# Patient Record
Sex: MALE | Race: OTHER | Employment: FULL TIME | URBAN - METROPOLITAN AREA
[De-identification: names, ages, dates, MRNs, and addresses within clinical notes are randomized per-mention and may not be internally consistent; named-entity substitution may affect disease eponyms.]

---

## 2017-03-11 ENCOUNTER — OFFICE VISIT (OUTPATIENT)
Dept: INTERNAL MEDICINE CLINIC | Facility: CLINIC | Age: 22
End: 2017-03-11

## 2017-03-11 VITALS
HEART RATE: 78 BPM | BODY MASS INDEX: 29.03 KG/M2 | WEIGHT: 196 LBS | SYSTOLIC BLOOD PRESSURE: 122 MMHG | DIASTOLIC BLOOD PRESSURE: 77 MMHG | HEIGHT: 69 IN | RESPIRATION RATE: 18 BRPM

## 2017-03-11 DIAGNOSIS — M54.6 ACUTE MIDLINE THORACIC BACK PAIN: Primary | ICD-10-CM

## 2017-03-11 DIAGNOSIS — K08.89 PAIN IN A TOOTH OR TEETH: ICD-10-CM

## 2017-03-11 PROCEDURE — 99212 OFFICE O/P EST SF 10 MIN: CPT | Performed by: INTERNAL MEDICINE

## 2017-03-11 PROCEDURE — 99214 OFFICE O/P EST MOD 30 MIN: CPT | Performed by: INTERNAL MEDICINE

## 2017-03-11 RX ORDER — NAPROXEN 375 MG/1
1 TABLET, DELAYED RELEASE ORAL EVERY 12 HOURS
Qty: 60 TABLET | Refills: 1 | Status: SHIPPED | OUTPATIENT
Start: 2017-03-11 | End: 2017-04-10

## 2017-03-11 RX ORDER — RANITIDINE 150 MG/1
150 CAPSULE ORAL 2 TIMES DAILY
Qty: 60 CAPSULE | Refills: 1 | Status: SHIPPED | OUTPATIENT
Start: 2017-03-11 | End: 2017-06-13

## 2017-03-11 NOTE — PATIENT INSTRUCTIONS
Exercises at Your Workstation: Shoulders  Tight shoulders? Aching back? A few easy moves can help your shoulders and back feel better. Take a few minutes during your day to do these exercises, right at your desk.  They'll loosen up your muscles, keep you

## 2017-03-11 NOTE — PROGRESS NOTES
HPI:    Patient ID: Maico Murdock is a 22year old male. HPI Comments: He was treated at school for sinusitis. He was given antibiotics twice. Now he has a minor headache when he wakes up. It comes in waves when he wakes up.   He also has pr Lymphadenopathy:     He has no cervical adenopathy. Neurological: He displays abnormal reflex. ASSESSMENT/PLAN:   1. Acute midline thoracic back pain  Muscle tension pain. Stretching exercises given. - Naproxen 375 MG Oral Tab EC;  Take 3

## 2017-06-13 ENCOUNTER — HOSPITAL ENCOUNTER (OUTPATIENT)
Dept: GENERAL RADIOLOGY | Facility: HOSPITAL | Age: 22
Discharge: HOME | End: 2017-06-13
Attending: INTERNAL MEDICINE
Payer: COMMERCIAL

## 2017-06-13 ENCOUNTER — OFFICE VISIT (OUTPATIENT)
Dept: INTERNAL MEDICINE CLINIC | Facility: CLINIC | Age: 22
End: 2017-06-13

## 2017-06-13 VITALS
SYSTOLIC BLOOD PRESSURE: 138 MMHG | HEIGHT: 69 IN | HEART RATE: 57 BPM | DIASTOLIC BLOOD PRESSURE: 79 MMHG | BODY MASS INDEX: 28.29 KG/M2 | WEIGHT: 191 LBS

## 2017-06-13 DIAGNOSIS — M25.521 RIGHT ELBOW PAIN: ICD-10-CM

## 2017-06-13 DIAGNOSIS — M25.521 RIGHT ELBOW PAIN: Primary | ICD-10-CM

## 2017-06-13 PROCEDURE — 73080 X-RAY EXAM OF ELBOW: CPT | Performed by: INTERNAL MEDICINE

## 2017-06-13 PROCEDURE — 99213 OFFICE O/P EST LOW 20 MIN: CPT | Performed by: INTERNAL MEDICINE

## 2017-06-13 PROCEDURE — 99212 OFFICE O/P EST SF 10 MIN: CPT | Performed by: INTERNAL MEDICINE

## 2017-06-13 NOTE — PATIENT INSTRUCTIONS
Await results of right elbow x-ray. Rest and elevate your right elbow and apply ice 2-3 times daily if helpful. Take Tylenol or ibuprofen as needed. Call if no better.

## 2017-06-13 NOTE — PROGRESS NOTES
Arik Hammond is a 22year old male. Patient presents with:  Elbow Pain: Pt c/o shooting pain in the right elbow     HPI:   He injured his right elbow at a water park on Thursday, 5 days ago.   He was coming down a water slide at a fast rate of sp extension pronation and supination without limitation. No right forearm right wrist or right hand tenderness. ASSESSMENT AND PLAN:   1. Right elbow pain  Likely contusion. Rule out fracture. X-ray right elbow today. Order sent.   Recommend rest, el

## 2018-05-23 ENCOUNTER — TELEPHONE (OUTPATIENT)
Dept: OPHTHALMOLOGY | Facility: CLINIC | Age: 23
End: 2018-05-23

## 2018-05-23 ENCOUNTER — OFFICE VISIT (OUTPATIENT)
Dept: INTERNAL MEDICINE CLINIC | Facility: CLINIC | Age: 23
End: 2018-05-23

## 2018-05-23 VITALS
WEIGHT: 198 LBS | BODY MASS INDEX: 29.33 KG/M2 | HEIGHT: 69 IN | SYSTOLIC BLOOD PRESSURE: 118 MMHG | HEART RATE: 72 BPM | DIASTOLIC BLOOD PRESSURE: 74 MMHG

## 2018-05-23 DIAGNOSIS — H57.11 PAIN OF RIGHT EYE: Primary | ICD-10-CM

## 2018-05-23 DIAGNOSIS — F31.89 SEVERE BIPOLAR AFFECTIVE DISORDER WITH PSYCHOSIS (HCC): ICD-10-CM

## 2018-05-23 PROCEDURE — 99213 OFFICE O/P EST LOW 20 MIN: CPT | Performed by: INTERNAL MEDICINE

## 2018-05-23 PROCEDURE — 99212 OFFICE O/P EST SF 10 MIN: CPT | Performed by: INTERNAL MEDICINE

## 2018-05-23 RX ORDER — ZIPRASIDONE HYDROCHLORIDE 20 MG/1
20 CAPSULE ORAL AS NEEDED
COMMUNITY

## 2018-05-23 RX ORDER — LITHIUM CARBONATE 450 MG
450 TABLET, EXTENDED RELEASE ORAL 2 TIMES DAILY
COMMUNITY
Start: 2018-04-03 | End: 2019-01-11 | Stop reason: ALTCHOICE

## 2018-05-23 RX ORDER — LAMOTRIGINE 25 MG/1
TABLET ORAL
COMMUNITY
Start: 2018-03-21 | End: 2018-05-23

## 2018-05-23 NOTE — PROGRESS NOTES
HPI:    Patient ID: Chad Hernández is a 25year old male. Pt c/o pain in his right eye for 2 days. It went away then came back. It doesn't feel like it is on the surface. It feels the worst in the morning. It has been itchy.       Eye Pain Position: Sitting, Cuff Size: large)   Pulse 72   Ht 5' 9\" (1.753 m)   Wt 198 lb (89.8 kg)   BMI 29.24 kg/m²   PHYSICAL EXAM:   Physical Exam    Constitutional: He is oriented to person, place, and time. He appears well-developed and well-nourished.    HEN

## 2018-05-24 NOTE — TELEPHONE ENCOUNTER
Pt complains of eye pain 6/10 only with eye movement side to side since Monday May 21. Pt does not wear contacts, no FB sensation. I offered appointment today with Dr. Caroline Partida or be seen with [facility] but pt declined due to woke.  Apt booked lisa

## 2018-05-24 NOTE — TELEPHONE ENCOUNTER
Pt called back but when the phone room tried to transfer the call; pt hung up. I tried calling back but no answer.

## 2018-05-25 ENCOUNTER — OFFICE VISIT (OUTPATIENT)
Dept: OPHTHALMOLOGY | Facility: CLINIC | Age: 23
End: 2018-05-25

## 2018-05-25 DIAGNOSIS — H02.003: ICD-10-CM

## 2018-05-25 DIAGNOSIS — H40.003 GLAUCOMA SUSPECT OF BOTH EYES: ICD-10-CM

## 2018-05-25 DIAGNOSIS — S05.01XA CONJUNCTIVAL ABRASION, RIGHT, INITIAL ENCOUNTER: Primary | ICD-10-CM

## 2018-05-25 PROCEDURE — 99243 OFF/OP CNSLTJ NEW/EST LOW 30: CPT | Performed by: OPHTHALMOLOGY

## 2018-05-25 PROCEDURE — 99212 OFFICE O/P EST SF 10 MIN: CPT | Performed by: OPHTHALMOLOGY

## 2018-05-25 RX ORDER — OFLOXACIN 3 MG/ML
1 SOLUTION/ DROPS OPHTHALMIC 3 TIMES DAILY
Qty: 1 BOTTLE | Refills: 0 | Status: SHIPPED | OUTPATIENT
Start: 2018-05-25 | End: 2018-05-30

## 2018-05-25 NOTE — PATIENT INSTRUCTIONS
Conjunctival abrasion, right, initial encounter  Abberant lash removed. Ofloxacin 3 times a day to Right eye for 5 days    Eyelashes turned in, right    Abberant lash in Meibomian gland opening turned inward. Removed.     Glaucoma suspect of both eyes  Incr

## 2018-05-25 NOTE — PROGRESS NOTES
Jaspreet Kaplan is a 25year old male. HPI:     HPI     Consult    Additional comments: Per Dr. Dinora Chandra   NP/ 25 yr old here for an evaluation of eye pain OD 6/10 since Monday May 21.  Pt does not wear any contacts and no FB se Gastrointestinal, Neurological, Skin, Genitourinary, Musculoskeletal, HENT, Endocrine, Cardiovascular, Respiratory, Psychiatric, Allergic/Imm, Heme/Lymph    Last edited by Binh Allen O.T. on 5/25/2018 12:22 PM. (History)          PHYSICAL EXAM: instructions:  Return in about 2 months (around 7/25/2018) for Complete eye exam and OCT due to status of glaucoma suspect . 5/25/2018  Scribed by: Nara Mejias MD

## 2019-01-08 ENCOUNTER — NURSE TRIAGE (OUTPATIENT)
Dept: INTERNAL MEDICINE CLINIC | Facility: CLINIC | Age: 24
End: 2019-01-08

## 2019-01-08 NOTE — TELEPHONE ENCOUNTER
Action Requested: Summary for Provider     []  Critical Lab, Recommendations Needed  [] Need Additional Advice  []   FYI    []   Need Orders  [] Need Medications Sent to Pharmacy  []  Other     SUMMARY: Appt scheduled for Fri 1/11/19 with Jos Arreola 150, for s

## 2019-01-11 ENCOUNTER — OFFICE VISIT (OUTPATIENT)
Dept: INTERNAL MEDICINE CLINIC | Facility: CLINIC | Age: 24
End: 2019-01-11
Payer: COMMERCIAL

## 2019-01-11 VITALS
BODY MASS INDEX: 31.7 KG/M2 | WEIGHT: 214 LBS | SYSTOLIC BLOOD PRESSURE: 118 MMHG | HEIGHT: 69 IN | HEART RATE: 64 BPM | DIASTOLIC BLOOD PRESSURE: 75 MMHG

## 2019-01-11 DIAGNOSIS — R21 RASH: Primary | ICD-10-CM

## 2019-01-11 PROCEDURE — 99213 OFFICE O/P EST LOW 20 MIN: CPT | Performed by: INTERNAL MEDICINE

## 2019-01-11 PROCEDURE — 99212 OFFICE O/P EST SF 10 MIN: CPT | Performed by: INTERNAL MEDICINE

## 2019-01-11 NOTE — PROGRESS NOTES
Carmelita Farr is a 26year old male. Patient presents with:  Rash    HPI:   Last October he developed a rash on his torso, affecting his chest and back. More recently, rash has spread to his arms. He has no associated pain or itchiness.   He andrés to the plan. The patient is asked to return as needed.     Michael Coronado MD  1/11/2019  1:23 PM

## 2022-05-14 ENCOUNTER — HOSPITAL ENCOUNTER (OUTPATIENT)
Age: 27
Discharge: HOME | End: 2022-05-14
Payer: COMMERCIAL

## 2022-05-14 VITALS
BODY MASS INDEX: 37.39 KG/M2 | SYSTOLIC BLOOD PRESSURE: 128 MMHG | DIASTOLIC BLOOD PRESSURE: 77 MMHG | OXYGEN SATURATION: 100 % | HEART RATE: 93 BPM | HEIGHT: 70 IN | TEMPERATURE: 97 F | RESPIRATION RATE: 21 BRPM | WEIGHT: 261.19 LBS

## 2022-05-14 DIAGNOSIS — U07.1 COVID-19: Primary | ICD-10-CM

## 2022-05-14 LAB
POCT INFLUENZA A: NEGATIVE
POCT INFLUENZA B: NEGATIVE
S PYO AG THROAT QL: NEGATIVE
SARS-COV-2 RNA RESP QL NAA+PROBE: DETECTED

## 2022-05-14 PROCEDURE — 99203 OFFICE O/P NEW LOW 30 MIN: CPT | Performed by: NURSE PRACTITIONER

## 2022-05-14 PROCEDURE — 87880 STREP A ASSAY W/OPTIC: CPT | Performed by: NURSE PRACTITIONER

## 2022-05-14 PROCEDURE — U0002 COVID-19 LAB TEST NON-CDC: HCPCS | Performed by: NURSE PRACTITIONER

## 2022-05-14 PROCEDURE — 87502 INFLUENZA DNA AMP PROBE: CPT | Performed by: NURSE PRACTITIONER

## 2022-05-26 ENCOUNTER — APPOINTMENT (OUTPATIENT)
Dept: GENERAL RADIOLOGY | Age: 27
End: 2022-05-26
Attending: NURSE PRACTITIONER
Payer: COMMERCIAL

## 2022-05-26 ENCOUNTER — HOSPITAL ENCOUNTER (OUTPATIENT)
Age: 27
Discharge: HOME | End: 2022-05-26
Payer: COMMERCIAL

## 2022-05-26 VITALS
HEART RATE: 76 BPM | TEMPERATURE: 98 F | DIASTOLIC BLOOD PRESSURE: 83 MMHG | SYSTOLIC BLOOD PRESSURE: 126 MMHG | RESPIRATION RATE: 18 BRPM | OXYGEN SATURATION: 100 %

## 2022-05-26 DIAGNOSIS — B34.9 VIRAL ILLNESS: Primary | ICD-10-CM

## 2022-05-26 DIAGNOSIS — Z86.16 HISTORY OF COVID-19: ICD-10-CM

## 2022-05-26 LAB
#MXD IC: 0.7 X10ˆ3/UL (ref 0.1–1)
BUN BLD-MCNC: 9 MG/DL (ref 7–18)
CHLORIDE BLD-SCNC: 103 MMOL/L (ref 98–112)
CO2 BLD-SCNC: 26 MMOL/L (ref 21–32)
CREAT BLD-MCNC: 0.8 MG/DL
DDIMER WHOLE BLOOD: <200 NG/ML DDU (ref ?–400)
GLUCOSE BLD-MCNC: 96 MG/DL (ref 70–99)
HCT VFR BLD AUTO: 46.9 %
HCT VFR BLD CALC: 51 %
HGB BLD-MCNC: 16.2 G/DL
ISTAT IONIZED CALCIUM FOR CHEM 8: 1.22 MMOL/L (ref 1.12–1.32)
LYMPHOCYTES # BLD AUTO: 2.8 X10ˆ3/UL (ref 1–4)
LYMPHOCYTES NFR BLD AUTO: 34.3 %
MCH RBC QN AUTO: 29.9 PG (ref 26–34)
MCHC RBC AUTO-ENTMCNC: 34.5 G/DL (ref 31–37)
MCV RBC AUTO: 86.7 FL (ref 80–100)
MIXED CELL %: 8.2 %
NEUTROPHILS # BLD AUTO: 4.7 X10ˆ3/UL (ref 1.5–7.7)
NEUTROPHILS NFR BLD AUTO: 57.5 %
PLATELET # BLD AUTO: 225 X10ˆ3/UL (ref 150–450)
POTASSIUM BLD-SCNC: 4.3 MMOL/L (ref 3.6–5.1)
RBC # BLD AUTO: 5.41 X10ˆ6/UL
SODIUM BLD-SCNC: 139 MMOL/L (ref 136–145)
WBC # BLD AUTO: 8.2 X10ˆ3/UL (ref 4–11)

## 2022-05-26 PROCEDURE — 71046 X-RAY EXAM CHEST 2 VIEWS: CPT | Performed by: NURSE PRACTITIONER

## 2022-05-26 RX ORDER — SODIUM CHLORIDE 9 MG/ML
1000 INJECTION, SOLUTION INTRAVENOUS ONCE
Status: COMPLETED | OUTPATIENT
Start: 2022-05-26 | End: 2022-05-26

## 2022-05-26 NOTE — ED INITIAL ASSESSMENT (HPI)
Pt here with complaints of diarrhea, cough, wheezing that has been going on for 4 days, pt states he was tested +covid 5/14/22, pt states he feels sob with ambulation and started having a metal taste in mouth yesterday, pt denies any fevers

## 2022-06-06 ENCOUNTER — LAB ENCOUNTER (OUTPATIENT)
Dept: LAB | Age: 27
End: 2022-06-06
Attending: INTERNAL MEDICINE
Payer: COMMERCIAL

## 2022-06-06 DIAGNOSIS — Z00.00 ROUTINE GENERAL MEDICAL EXAMINATION AT A HEALTH CARE FACILITY: Primary | ICD-10-CM

## 2022-06-06 PROBLEM — H61.23 BILATERAL IMPACTED CERUMEN: Status: ACTIVE | Noted: 2022-06-06

## 2022-06-06 PROBLEM — F25.9 SCHIZO-AFFECTIVE SCHIZOPHRENIA (HCC): Status: ACTIVE | Noted: 2022-06-06

## 2022-06-06 PROBLEM — E66.9 OBESITY (BMI 30-39.9): Status: ACTIVE | Noted: 2022-06-06

## 2022-06-06 PROBLEM — F25.0 SCHIZOAFFECTIVE DISORDER, BIPOLAR TYPE (HCC): Status: ACTIVE | Noted: 2022-06-06

## 2022-06-06 LAB
CHOLEST SERPL-MCNC: 212 MG/DL (ref ?–200)
FASTING PATIENT LIPID ANSWER: YES
HDLC SERPL-MCNC: 38 MG/DL (ref 40–59)
LDLC SERPL CALC-MCNC: 143 MG/DL (ref ?–100)
NONHDLC SERPL-MCNC: 174 MG/DL (ref ?–130)
TRIGL SERPL-MCNC: 173 MG/DL (ref 30–149)
VLDLC SERPL CALC-MCNC: 32 MG/DL (ref 0–30)

## 2022-06-06 PROCEDURE — 85025 COMPLETE CBC W/AUTO DIFF WBC: CPT | Performed by: INTERNAL MEDICINE

## 2022-06-06 PROCEDURE — 84443 ASSAY THYROID STIM HORMONE: CPT | Performed by: INTERNAL MEDICINE

## 2022-06-06 PROCEDURE — 80061 LIPID PANEL: CPT

## 2022-06-06 PROCEDURE — 36415 COLL VENOUS BLD VENIPUNCTURE: CPT | Performed by: INTERNAL MEDICINE

## 2022-06-06 PROCEDURE — 80053 COMPREHEN METABOLIC PANEL: CPT | Performed by: INTERNAL MEDICINE

## 2022-06-09 ENCOUNTER — TELEPHONE (OUTPATIENT)
Dept: CASE MANAGEMENT | Age: 27
End: 2022-06-09

## 2022-06-09 NOTE — TELEPHONE ENCOUNTER
We will try psychiatrist from [facility], [facility]  -MD     Referral placed to Mrs.

## 2022-06-13 NOTE — TELEPHONE ENCOUNTER
Encounter Messages    Read Composed From To  Subject   Y 6/10/2022 10:35 AM Viviane Primrose, [address]  Referral       Referral  Message [id number]  From   Viviane Primrose, RMA To   Praveen Sanchez and Delivered   6/10/2022 10:35 AM   Last Read in [address]   6/10/2022 10:35 AM by Bella Driscoll

## 2022-06-15 ENCOUNTER — HOSPITAL ENCOUNTER (OUTPATIENT)
Age: 27
Discharge: HOME | End: 2022-06-15
Payer: COMMERCIAL

## 2022-06-15 VITALS
TEMPERATURE: 97 F | SYSTOLIC BLOOD PRESSURE: 120 MMHG | DIASTOLIC BLOOD PRESSURE: 74 MMHG | HEART RATE: 68 BPM | OXYGEN SATURATION: 100 % | RESPIRATION RATE: 18 BRPM

## 2022-06-15 DIAGNOSIS — H92.02 LEFT EAR PAIN: ICD-10-CM

## 2022-06-15 DIAGNOSIS — H61.23 BILATERAL IMPACTED CERUMEN: Primary | ICD-10-CM

## 2022-06-15 PROCEDURE — 99213 OFFICE O/P EST LOW 20 MIN: CPT | Performed by: NURSE PRACTITIONER

## 2022-06-15 RX ORDER — OFLOXACIN 3 MG/ML
10 SOLUTION AURICULAR (OTIC) DAILY
Qty: 5 ML | Refills: 0 | Status: SHIPPED | OUTPATIENT
Start: 2022-06-15 | End: 2022-06-22

## 2022-06-15 NOTE — ED INITIAL ASSESSMENT (HPI)
Pt here with complaints of bilateral ear clogged, pt states he went swimming yesterday and began to have pain to his left ear, pt denies any fever

## 2022-06-20 ENCOUNTER — TELEPHONE (OUTPATIENT)
Dept: CASE MANAGEMENT | Age: 27
End: 2022-06-20

## 2022-06-30 ENCOUNTER — OFFICE VISIT (OUTPATIENT)
Dept: INTERNAL MEDICINE CLINIC | Facility: CLINIC | Age: 27
End: 2022-06-30
Payer: COMMERCIAL

## 2022-06-30 VITALS
SYSTOLIC BLOOD PRESSURE: 134 MMHG | DIASTOLIC BLOOD PRESSURE: 84 MMHG | HEIGHT: 70 IN | WEIGHT: 264 LBS | HEART RATE: 85 BPM | BODY MASS INDEX: 37.8 KG/M2

## 2022-06-30 DIAGNOSIS — B35.4 TINEA CORPORIS: Primary | ICD-10-CM

## 2022-06-30 DIAGNOSIS — F25.0 SCHIZOAFFECTIVE DISORDER, BIPOLAR TYPE (HCC): ICD-10-CM

## 2022-06-30 PROCEDURE — 3079F DIAST BP 80-89 MM HG: CPT | Performed by: INTERNAL MEDICINE

## 2022-06-30 PROCEDURE — 3008F BODY MASS INDEX DOCD: CPT | Performed by: INTERNAL MEDICINE

## 2022-06-30 PROCEDURE — 99213 OFFICE O/P EST LOW 20 MIN: CPT | Performed by: INTERNAL MEDICINE

## 2022-06-30 PROCEDURE — 3075F SYST BP GE 130 - 139MM HG: CPT | Performed by: INTERNAL MEDICINE

## 2022-06-30 RX ORDER — CLOTRIMAZOLE AND BETAMETHASONE DIPROPIONATE 10; .64 MG/G; MG/G
CREAM TOPICAL
Qty: 60 G | Refills: 0 | Status: SHIPPED | OUTPATIENT
Start: 2022-06-30

## 2022-09-02 ENCOUNTER — PATIENT MESSAGE (OUTPATIENT)
Dept: INTERNAL MEDICINE CLINIC | Facility: CLINIC | Age: 27
End: 2022-09-02

## 2023-10-19 ENCOUNTER — OFFICE VISIT (OUTPATIENT)
Dept: INTERNAL MEDICINE CLINIC | Facility: CLINIC | Age: 28
End: 2023-10-19

## 2023-10-19 VITALS
HEART RATE: 77 BPM | HEIGHT: 70 IN | DIASTOLIC BLOOD PRESSURE: 85 MMHG | SYSTOLIC BLOOD PRESSURE: 120 MMHG | WEIGHT: 267 LBS | BODY MASS INDEX: 38.22 KG/M2

## 2023-10-19 DIAGNOSIS — M54.50 LOW BACK PAIN, UNSPECIFIED BACK PAIN LATERALITY, UNSPECIFIED CHRONICITY, UNSPECIFIED WHETHER SCIATICA PRESENT: Primary | ICD-10-CM

## 2023-10-19 DIAGNOSIS — F25.0 SCHIZOAFFECTIVE DISORDER, BIPOLAR TYPE (HCC): ICD-10-CM

## 2023-10-19 DIAGNOSIS — E66.9 OBESITY (BMI 30-39.9): ICD-10-CM

## 2023-10-19 PROCEDURE — 3074F SYST BP LT 130 MM HG: CPT | Performed by: INTERNAL MEDICINE

## 2023-10-19 PROCEDURE — 99214 OFFICE O/P EST MOD 30 MIN: CPT | Performed by: INTERNAL MEDICINE

## 2023-10-19 PROCEDURE — 3079F DIAST BP 80-89 MM HG: CPT | Performed by: INTERNAL MEDICINE

## 2023-10-19 PROCEDURE — 3008F BODY MASS INDEX DOCD: CPT | Performed by: INTERNAL MEDICINE

## 2023-10-19 NOTE — PROGRESS NOTES
Subjective:   Patient ID: Karime Kerns is a 33year old male. Patient presents with:  Weight Problem  Pain: C/o electrical pain on his inner/outter thighs for about 2 years. Pain is only noticed when he's at work. Patient presents today for weight issues   patient state ,  gained  weight ever since  was on Seroquel, very heard  to loose  weight   Also state has quick pain in bill upper thighs that comes and goes for 2 years , only upon standing from prolonged  sitting (20 minutes). .sitting  goes away spontaneously  ,but  now more frequent - only at work happens , but  not at home   Has lower back pains for longer time  stiffness    Running , walking not causing pains  states doing well otherwise, denies chest pain, shortness of breath, dyspnea on exertion or heart palpitations also denies nausea, vomiting, diarrhea, constipation or abdominal pain. No fever, chills, or UTI symptoms. The rest of the review of systems, see below. Pain  This is a recurrent problem. Episode onset: 2  years. Associated symptoms include arthralgias (lower  back pains). Pertinent negatives include no abdominal pain, chest pain, chills, coughing, fatigue, fever, headaches, nausea, numbness, sore throat, vomiting or weakness. Exacerbated by: sitting for 20 minutes or longer. He has tried rest for the symptoms. History/Other:   Review of Systems   Constitutional:  Negative for chills, fatigue and fever. HENT:  Negative for ear pain and sore throat. Eyes:  Negative for pain and redness. Respiratory:  Negative for cough and wheezing. Cardiovascular:  Negative for chest pain and leg swelling. Gastrointestinal:  Negative for abdominal pain, constipation, diarrhea, nausea and vomiting. Genitourinary:  Negative for dysuria and frequency. Musculoskeletal:  Positive for arthralgias (lower  back pains) and back pain (lower  back occasionaly  -  bill tights on off  with stending).    Skin:  Negative for pallor. Neurological:  Negative for dizziness, weakness, numbness and headaches. Psychiatric/Behavioral:  Negative for sleep disturbance. Feels well now denies any unusual or severe depression or anxiety     Current Outpatient Medications   Medication Sig Dispense Refill    ARIPiprazole 2 MG Oral Tab Take 1 tablet (2 mg total) by mouth as needed. Allergies:No Known Allergies    Objective:   Physical Exam  Vitals and nursing note reviewed. Constitutional:       General: He is not in acute distress. Appearance: He is obese. HENT:      Head: Normocephalic and atraumatic. Right Ear: Tympanic membrane, ear canal and external ear normal.      Left Ear: Tympanic membrane, ear canal and external ear normal.      Nose: Nose normal.      Mouth/Throat:      Pharynx: Uvula midline. No oropharyngeal exudate or posterior oropharyngeal erythema. Eyes:      General: No scleral icterus. Right eye: No discharge. Left eye: No discharge. Cardiovascular:      Rate and Rhythm: Normal rate and regular rhythm. Heart sounds: Normal heart sounds. No murmur heard. Pulmonary:      Effort: Pulmonary effort is normal. No respiratory distress. Breath sounds: Normal breath sounds. No wheezing or rales. Abdominal:      Palpations: Abdomen is soft. There is no mass. Tenderness: There is no abdominal tenderness. There is no right CVA tenderness or left CVA tenderness. Comments: No hepatomegaly, no splenomegaly   Musculoskeletal:      Cervical back: Neck supple. Lumbar back: No tenderness or bony tenderness. Normal range of motion. Negative right straight leg raise test and negative left straight leg raise test.      Right lower leg: No edema. Left lower leg: No edema. Comments: No supratrochanteric tenderness bilaterally    Lymphadenopathy:      Cervical: No cervical adenopathy. Skin:     General: Skin is warm and dry.    Neurological:      General: No focal deficit present. Mental Status: He is alert and oriented to person, place, and time. Cranial Nerves: No cranial nerve deficit. Sensory: Sensation is intact. No sensory deficit. Motor: No weakness or atrophy. Gait: Gait normal.   Psychiatric:         Mood and Affect: Mood normal. Mood is not anxious or depressed. Behavior: Behavior normal.         Thought Content: Thought content does not include homicidal or suicidal ideation. Comments: Patient doing well at this time does not have any anxiety or depression symptoms at this time  He is using Abilify as needed only  for severe anxiety or panic disorder patient states   he is using that only once a month         2,422 mg  Blood pressure 120/85, pulse 77, height 5' 10\" (1.778 m), weight 267 lb (121.1 kg). Assessment & Plan:   Low back pain, unspecified back pain laterality, unspecified chronicity, unspecified whether sciatica present  (primary encounter diagnosis)  Pain in tights intermittent- upon standing -only after prolonged sitting with work  Recommend patient to change position often  Weight reduction discussed with patient  Keep stretching exercises keep with lower back exercises    Current patient to have x-ray of the lumbar spine  Refer to physical therapy Dr. Jade Smith for further evaluation and treatment  Xr lumbar spine     Schizoaffective disorder, bipolar type ([facility]  (F25.9)     Stable cpm   Plan: [facility] REFERRAL TO [facility]     Patient use Abilify 2 mg  1/2 tab as needed basis at this time -once per month per pt only if depression or anxiety   Patient  seeing  the therapist   Referred to see psychiatrist   referral provided to patient   he is stable at this time  He works as an analyst -computer work  He has a good support group his wife and his parents doing well at this time  Continue present management  Stable          (E66.9) Obesity (BMI 30-39. 9)  Plan:   Eat healthy, well balanced meals   Reduce daily calorie intake    Reach and maintain a healthy weight   Reduce salt, fat, sweets and pure sugar in diet   Include in your diet:  fruits, vegetables, low-saturated fat diet (lean chicken, turkey, and fish)  Exercise/walk regularly as tolerated  Refer to Dr Selvin Silva       Patient agrees with plan verbalized understanding and compliance        Meds This Visit:  Requested Prescriptions      No prescriptions requested or ordered in this encounter       Imaging & Referrals:  PHYSIATRY - INTERNAL  BARIATRICS - INTERNAL  OP REFERRAL TO [facility]  XR LUMBAR SPINE (MIN 2 VIEWS) (CPT=72100)

## 2024-01-03 ENCOUNTER — HOSPITAL ENCOUNTER (OUTPATIENT)
Dept: GENERAL RADIOLOGY | Facility: HOSPITAL | Age: 29
Discharge: HOME | End: 2024-01-03
Attending: INTERNAL MEDICINE
Payer: COMMERCIAL

## 2024-01-03 DIAGNOSIS — M54.50 LOW BACK PAIN, UNSPECIFIED BACK PAIN LATERALITY, UNSPECIFIED CHRONICITY, UNSPECIFIED WHETHER SCIATICA PRESENT: ICD-10-CM

## 2024-01-03 PROCEDURE — 72100 X-RAY EXAM L-S SPINE 2/3 VWS: CPT | Performed by: INTERNAL MEDICINE

## 2024-03-14 ENCOUNTER — OFFICE VISIT (OUTPATIENT)
Dept: INTERNAL MEDICINE CLINIC | Facility: CLINIC | Age: 29
End: 2024-03-14
Payer: COMMERCIAL

## 2024-03-14 VITALS
HEART RATE: 76 BPM | DIASTOLIC BLOOD PRESSURE: 82 MMHG | BODY MASS INDEX: 37.22 KG/M2 | SYSTOLIC BLOOD PRESSURE: 125 MMHG | WEIGHT: 260 LBS | HEIGHT: 70 IN

## 2024-03-14 DIAGNOSIS — F25.0 SCHIZOAFFECTIVE DISORDER, BIPOLAR TYPE (HCC): ICD-10-CM

## 2024-03-14 DIAGNOSIS — H61.20 IMPACTED CERUMEN, UNSPECIFIED LATERALITY: ICD-10-CM

## 2024-03-14 DIAGNOSIS — Z00.00 PE (PHYSICAL EXAM), ROUTINE: Primary | ICD-10-CM

## 2024-03-14 DIAGNOSIS — E55.9 VITAMIN D DEFICIENCY: ICD-10-CM

## 2024-03-14 DIAGNOSIS — Z83.3 FHX: DIABETES MELLITUS: ICD-10-CM

## 2024-03-14 PROCEDURE — 99395 PREV VISIT EST AGE 18-39: CPT | Performed by: INTERNAL MEDICINE

## 2024-03-14 PROCEDURE — 3079F DIAST BP 80-89 MM HG: CPT | Performed by: INTERNAL MEDICINE

## 2024-03-14 PROCEDURE — 3074F SYST BP LT 130 MM HG: CPT | Performed by: INTERNAL MEDICINE

## 2024-03-14 PROCEDURE — 99214 OFFICE O/P EST MOD 30 MIN: CPT | Performed by: INTERNAL MEDICINE

## 2024-03-14 PROCEDURE — 3008F BODY MASS INDEX DOCD: CPT | Performed by: INTERNAL MEDICINE

## 2024-03-14 RX ORDER — FLUTICASONE PROPIONATE 50 MCG
2 SPRAY, SUSPENSION (ML) NASAL DAILY
Qty: 1 EACH | Refills: 0 | Status: SHIPPED | OUTPATIENT
Start: 2024-03-14 | End: 2025-03-09

## 2024-03-14 NOTE — PROGRESS NOTES
Subjective:   Patient ID: Sachin Burns is a 28 year old male.  Chief Complaint   Patient presents with    Physical       Patient presents today for physical exam, has some cold sy 1 week ago now has mostly some postnasal drainage  , nasal  congestion ,  no  fever or chills    Patient  state  has few  episodes of  mild ankit - recently ,less  sleeping  so took abilify and  doing  better ,  seeing still his  therapist   regularly ,was canceled scheduling w psychiatrist   Scheduling apt w Psychiatrist soon.  , Patient denies chest pain, shortness of breath, dyspnea on exertion or heart palpitations also denies nausea, vomiting, diarrhea, constipation or abdominal pain. No fever, chills, or UTI symptoms.   The rest of the review of systems, see below.     Patient states he has a history of seasonal affective disorder sometimes with psychosis and hallucinations that are mostly tactile patient also has a history of bipolar type I could be sometimes either depression or ankit.  Patient states she was on the medication in the past but not taking that now except Abilify to 1 mg only as needed when he has more severe symptoms either depression or symptoms of ankit.  Patient states he uses only once a month doing pretty well for now.  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  He has a very good support system his wife and his parents.  Patient state seeing therapist since age of 17 .    Depression  Visit Type: follow-up (hx  schisoafective dz and  bipolar 1)  Patient is not experiencing: confusion, depressed mood, feelings of worthlessness, memory impairment, nervousness/anxiety, palpitations, shortness of breath, suicidal planning and thoughts of death.   Severity: mild   Sleep quality: good  Side effects:  Weight gain      History/Other:   Review of Systems   Constitutional:  Positive for unexpected weight change. Negative for chills, fatigue and fever.   HENT:   Positive for congestion (1 week), postnasal drip and rhinorrhea. Negative for ear pain and sore throat.    Eyes:  Negative for pain and redness.   Respiratory:  Positive for cough. Negative for shortness of breath and wheezing.    Cardiovascular:  Negative for chest pain, palpitations and leg swelling.   Gastrointestinal:  Negative for abdominal pain, constipation, diarrhea, nausea and vomiting.   Genitourinary:  Negative for dysuria and frequency.   Skin:  Negative for pallor.   Neurological:  Negative for dizziness and headaches.   Psychiatric/Behavioral:  Negative for behavioral problems, confusion and sleep disturbance. The patient is not nervous/anxious.         Feels well now denies any unusual or severe depression or anxiety     Current Outpatient Medications   Medication Sig Dispense Refill    ARIPiprazole 2 MG Oral Tab Take 1 tablet (2 mg total) by mouth as needed.       Allergies:No Known Allergies    Objective:   Physical Exam  Vitals and nursing note reviewed.   Constitutional:       General: He is not in acute distress.     Appearance: He is obese.   HENT:      Head: Normocephalic and atraumatic.      Right Ear: Tympanic membrane, ear canal and external ear normal. There is no impacted cerumen.      Left Ear: Tympanic membrane, ear canal and external ear normal. There is no impacted cerumen.      Nose: Nose normal. No congestion or rhinorrhea.      Mouth/Throat:      Mouth: Mucous membranes are moist.      Pharynx: Oropharynx is clear. Uvula midline. No oropharyngeal exudate or posterior oropharyngeal erythema.   Eyes:      General: No scleral icterus.        Right eye: No discharge.         Left eye: No discharge.      Conjunctiva/sclera: Conjunctivae normal.   Cardiovascular:      Rate and Rhythm: Normal rate and regular rhythm.      Heart sounds: Normal heart sounds. No murmur heard.  Pulmonary:      Effort: Pulmonary effort is normal. No respiratory distress.      Breath sounds: Normal breath  sounds. No wheezing or rales.   Abdominal:      Palpations: Abdomen is soft. There is no mass.      Tenderness: There is no abdominal tenderness. There is no right CVA tenderness or left CVA tenderness.      Comments: No hepatomegaly, no splenomegaly   Musculoskeletal:      Cervical back: Neck supple.      Right lower leg: No edema.      Left lower leg: No edema.   Lymphadenopathy:      Cervical: No cervical adenopathy.   Skin:     General: Skin is warm and dry.   Neurological:      Mental Status: He is alert and oriented to person, place, and time.   Psychiatric:         Mood and Affect: Mood is not anxious or depressed.         Behavior: Behavior normal.         Thought Content: Thought content does not include homicidal or suicidal ideation.      Comments: Patient doing well at this time does not have any anxiety or depression symptoms at this time  He is using Abilify as needed either for severe anxiety / ankit  sy patient states he is using that only once a month usually          2,358 mg  Blood pressure 125/82, pulse 76, height 5' 10\" (1.778 m), weight 260 lb (117.9 kg).      Assessment & Plan:   No diagnosis found.   PE (physical exam), routine  Plan:   Maintain a healthy diet , low saturated fat and low sugar diet  Keep good hydration  Maintain a regular activity /walking as tolerated   Complete labs as ordered,   Preventative health maintenance tests reviewed   Immunizations reviewed patient recently had Recommend tdap -flu shot yearly covid 29 booster  Patient verbalized understanding and compliance       (F25.0) Schizoaffective disorder, bipolar type   Plan: OP REFERRAL TO [facility]   Had some manic milder episode  - took Abilify few times recently  - will see Psychiatrist referred  Had apt but was canceled    Therapist -seeing since age of 16 y/o       (E66.9) Obesity (BMI 30-39.9)  Plan:   Eat healthy, well balanced meals   Reduce daily calorie intake    Reach and maintain a healthy weight   Reduce salt,  fat, sweets and pure sugar in diet   Include in your diet:  fruits, vegetables, low-saturated fat diet (lean chicken, turkey, and fish)  Exercise/walk regularly as tolerated  A1c         Allergic rhinitis   Flonase 2 puffs every day few  days-1  week than as needed   Claritin otc 10  mg as needed         Vitamin d  deficiency  Taking Vit D3  5000 u daily   Vit D levels   Labs             during an office visit (3/14/2024  3:53 PM)        Intervention:  Encourage patient to initiate behavioral health services with a [facility] Order or [facility] order.  Educate patient to call [facility] at [phone] if symptoms worsen.     Meds This Visit:  Requested Prescriptions      No prescriptions requested or ordered in this encounter       Imaging & Referrals:  None

## 2024-03-18 NOTE — PROGRESS NOTES
Subjective:   Patient ID: Sachin Burns is a 28 year old male.  Chief Complaint   Patient presents with    Physical    Upper Respiratory Infection       Patient presents today for physical exam, has some cold sy 1 week ago now has mostly some postnasal drainage  , nasal  congestion ,  no  fever or chills    Patient  state  has few  episodes of  mild ankit - recently ,less  sleeping  so took abilify and  doing  better ,  seeing still his  therapist   regularly ,was canceled scheduling w psychiatrist   Scheduling apt w Psychiatrist soon.  , Patient denies chest pain, shortness of breath, dyspnea on exertion or heart palpitations also denies nausea, vomiting, diarrhea, constipation or abdominal pain. No fever, chills, or UTI symptoms.   The rest of the review of systems, see below.     Patient states he has a history of seasonal affective disorder sometimes with psychosis and hallucinations that are mostly tactile patient also has a history of bipolar type I could be sometimes either depression or ankit.  Patient states she was on the medication in the past but not taking that now except Abilify to 1 mg only as needed when he has more severe symptoms either depression or symptoms of ankit.  Patient states he uses only once a month doing pretty well for now.  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  He has a very good support system his wife and his parents.  Patient state seeing therapist since age of 17 .    Depression  Visit Type: follow-up (hx  schisoafective dz and  bipolar 1)  Patient is not experiencing: confusion, depressed mood, feelings of worthlessness, memory impairment, nervousness/anxiety, palpitations, shortness of breath, suicidal planning and thoughts of death.   Severity: mild   Sleep quality: good  Side effects:  Weight gain  Upper Respiratory Infection   This is a new problem. The current episode started in the past 7 days. The problem has  been gradually improving. There has been no fever. Associated symptoms include congestion (1 week), coughing and rhinorrhea. Pertinent negatives include no abdominal pain, chest pain, diarrhea, dysuria, ear pain, headaches, nausea, sore throat, vomiting or wheezing. He has tried nothing for the symptoms. The treatment provided significant relief.       History/Other:   Review of Systems   Constitutional:  Positive for unexpected weight change. Negative for chills, fatigue and fever.   HENT:  Positive for congestion (1 week), postnasal drip and rhinorrhea. Negative for ear pain and sore throat.    Eyes:  Negative for pain and redness.   Respiratory:  Positive for cough. Negative for shortness of breath and wheezing.    Cardiovascular:  Negative for chest pain, palpitations and leg swelling.   Gastrointestinal:  Negative for abdominal pain, constipation, diarrhea, nausea and vomiting.   Genitourinary:  Negative for dysuria and frequency.   Skin:  Negative for pallor.   Neurological:  Negative for dizziness and headaches.   Psychiatric/Behavioral:  Negative for behavioral problems, confusion and sleep disturbance. The patient is not nervous/anxious.         Feels well now denies any unusual or severe depression or anxiety     Current Outpatient Medications   Medication Sig Dispense Refill    fluticasone propionate 50 MCG/ACT Nasal Suspension 2 sprays by Each Nare route daily. Apply two puffs in each nostril once daily for 1-week then as needed 1 each 0    ARIPiprazole 2 MG Oral Tab Take 1 tablet (2 mg total) by mouth as needed.       Allergies:No Known Allergies    Objective:   Physical Exam  Vitals and nursing note reviewed.   Constitutional:       General: He is not in acute distress.     Appearance: He is obese.   HENT:      Head: Normocephalic and atraumatic.      Right Ear: Tympanic membrane, ear canal and external ear normal. There is impacted cerumen.      Left Ear: Tympanic membrane, ear canal and external ear  normal. There is impacted cerumen.      Nose: Nose normal. No congestion or rhinorrhea.      Mouth/Throat:      Mouth: Mucous membranes are moist.      Pharynx: Oropharynx is clear. Uvula midline. No oropharyngeal exudate or posterior oropharyngeal erythema.   Eyes:      General: No scleral icterus.        Right eye: No discharge.         Left eye: No discharge.      Conjunctiva/sclera: Conjunctivae normal.   Cardiovascular:      Rate and Rhythm: Normal rate and regular rhythm.      Heart sounds: Normal heart sounds. No murmur heard.  Pulmonary:      Effort: Pulmonary effort is normal. No respiratory distress.      Breath sounds: Normal breath sounds. No wheezing or rales.   Abdominal:      Palpations: Abdomen is soft. There is no mass.      Tenderness: There is no abdominal tenderness. There is no right CVA tenderness or left CVA tenderness.      Comments: No hepatomegaly, no splenomegaly   Musculoskeletal:      Cervical back: Neck supple.      Right lower leg: No edema.      Left lower leg: No edema.   Lymphadenopathy:      Cervical: No cervical adenopathy.   Skin:     General: Skin is warm and dry.   Neurological:      Mental Status: He is alert and oriented to person, place, and time.   Psychiatric:         Mood and Affect: Mood is not anxious or depressed.         Behavior: Behavior normal.         Thought Content: Thought content does not include homicidal or suicidal ideation.      Comments: Patient doing well at this time does not have any anxiety or depression symptoms at this time  He is using Abilify as needed either for severe anxiety / ankit  sy patient states he is using that only once a month usually          2,358 mg  Blood pressure 125/82, pulse 76, height 5' 10\" (1.778 m), weight 260 lb (117.9 kg).      Assessment & Plan:   1. PE (physical exam), routine    2. Vitamin D deficiency    3. FHx: diabetes mellitus    4. Schizoaffective disorder, bipolar type (HCC)    5. Impacted cerumen, unspecified  laterality       PE (physical exam), routine  Plan:   Maintain a healthy diet , low saturated fat and low sugar diet  Keep good hydration  Maintain a regular activity /walking as tolerated   Complete labs as ordered,   Preventative health maintenance tests reviewed   Immunizations reviewed patient recently had Recommend tdap -flu shot yearly covid 29 booster  Patient verbalized understanding and compliance       (F25.0) Schizoaffective disorder, bipolar type   Plan: OP REFERRAL TO [facility]   Had some manic milder episode  - took Abilify few times recently  - will see Psychiatrist referred  Had apt but was canceled    Therapist -seeing since age of 16 y/o       (E66.9) Obesity (BMI 30-39.9)  Plan:   Eat healthy, well balanced meals   Reduce daily calorie intake    Reach and maintain a healthy weight   Reduce salt, fat, sweets and pure sugar in diet   Include in your diet:  fruits, vegetables, low-saturated fat diet (lean chicken, turkey, and fish)  Exercise/walk regularly as tolerated  A1c         Allergic rhinitis   Flonase 2 puffs every day few  days-1  week than as needed   Claritin otc 10  mg as needed         Vitamin d  deficiency  Taking Vit D3  5000 u daily   Vit D levels   Labs       Impacted cerumen  Refer to ENT for ear irrigation       during an office visit (3/14/2024  3:53 PM)        Intervention:  Encourage patient to initiate behavioral health services with a [facility] Navigation Order or BHI order.  Educate patient to call [facility] at [phone] if symptoms worsen.     Meds This Visit:  Requested Prescriptions     Signed Prescriptions Disp Refills    fluticasone propionate 50 MCG/ACT Nasal Suspension 1 each 0     Si sprays by Each Nare route daily. Apply two puffs in each nostril once daily for 1-week then as needed       Imaging & Referrals:  OP REFERRAL TO [facility]  ENT - INTERNAL

## 2024-03-22 ENCOUNTER — TELEPHONE (OUTPATIENT)
Age: 29
End: 2024-03-22

## 2024-03-27 ENCOUNTER — TELEPHONE (OUTPATIENT)
Age: 29
End: 2024-03-27

## 2024-05-22 ENCOUNTER — OFFICE VISIT (OUTPATIENT)
Dept: FAMILY MEDICINE CLINIC | Facility: CLINIC | Age: 29
End: 2024-05-22

## 2024-05-22 VITALS
BODY MASS INDEX: 37 KG/M2 | SYSTOLIC BLOOD PRESSURE: 118 MMHG | RESPIRATION RATE: 16 BRPM | HEART RATE: 63 BPM | WEIGHT: 261 LBS | DIASTOLIC BLOOD PRESSURE: 70 MMHG | TEMPERATURE: 97 F | OXYGEN SATURATION: 97 %

## 2024-05-22 DIAGNOSIS — H60.392: ICD-10-CM

## 2024-05-22 DIAGNOSIS — H66.002 NON-RECURRENT ACUTE SUPPURATIVE OTITIS MEDIA OF LEFT EAR WITHOUT SPONTANEOUS RUPTURE OF TYMPANIC MEMBRANE: Primary | ICD-10-CM

## 2024-05-22 PROCEDURE — 3078F DIAST BP <80 MM HG: CPT | Performed by: NURSE PRACTITIONER

## 2024-05-22 PROCEDURE — 3074F SYST BP LT 130 MM HG: CPT | Performed by: NURSE PRACTITIONER

## 2024-05-22 PROCEDURE — 99213 OFFICE O/P EST LOW 20 MIN: CPT | Performed by: NURSE PRACTITIONER

## 2024-05-22 RX ORDER — CIPROFLOXACIN AND DEXAMETHASONE 3; 1 MG/ML; MG/ML
4 SUSPENSION/ DROPS AURICULAR (OTIC) 2 TIMES DAILY
Qty: 1 EACH | Refills: 0 | Status: SHIPPED | OUTPATIENT
Start: 2024-05-22 | End: 2024-05-29

## 2024-05-22 RX ORDER — AMOXICILLIN AND CLAVULANATE POTASSIUM 875; 125 MG/1; MG/1
1 TABLET, FILM COATED ORAL 2 TIMES DAILY
Qty: 14 TABLET | Refills: 0 | Status: SHIPPED | OUTPATIENT
Start: 2024-05-22 | End: 2024-05-29

## 2024-05-22 NOTE — PROGRESS NOTES
CHIEF COMPLAINT:     Chief Complaint   Patient presents with    Ear Problem     Blockage and or infection - Entered by patient left ear pain when flushing x Yesterday        HPI:   Sachin Burns is a 28 year old male who presents to clinic today with complaints of left ear pain. Has had for 1  days. Pain is described as tender and sore.  Patient denies history of ear infections. Home treatment includes at home ear wax removal which was successful but now pt has ear tenderness. Pt states that he has ear wax impaction in the past.     Associated symptoms:  Patient denies decreased hearing. Patient denies hearing loss. Patient denies drainage. Patient denies use of cotton tipped ear swabs to clean the ears. Patient reports following URI symptoms: none, otherwise pt feels well    Current Outpatient Medications   Medication Sig Dispense Refill    amoxicillin clavulanate 875-125 MG Oral Tab Take 1 tablet by mouth 2 (two) times daily for 7 days. 14 tablet 0    ciprofloxacin-dexamethasone 0.3-0.1 % Otic Suspension Place 4 drops into the left ear 2 (two) times daily for 7 days. 1 each 0    fluticasone propionate 50 MCG/ACT Nasal Suspension 2 sprays by Each Nare route daily. Apply two puffs in each nostril once daily for 1-week then as needed 1 each 0    ARIPiprazole 2 MG Oral Tab Take 1 tablet (2 mg total) by mouth as needed.        Past Medical History:    Anxiety    Bipolar disorder, current episode manic severe with psychotic features (HCC)    Depression      Social History:  Social History     Socioeconomic History    Marital status:    Tobacco Use    Smoking status: Former     Types: Cigars    Smokeless tobacco: Never    Tobacco comments:     pt smoked  only month 4 Cigars/day   Vaping Use    Vaping status: Former   Substance and Sexual Activity    Alcohol use: Yes     Comment: 2 times per week    Drug use: Never    Sexual activity: Yes     Birth control/protection: Condom   Social History Narrative     ** Merged History Encounter **             REVIEW OF SYSTEMS:   GENERAL: See HPI  Review of Systems   Constitutional:  Negative for chills and fever.   HENT:  Positive for ear pain. Negative for congestion, ear discharge, postnasal drip, rhinorrhea, sinus pressure, sinus pain and sore throat.    Eyes:  Negative for discharge and redness.   Respiratory:  Negative for choking.    Cardiovascular:  Negative for chest pain.   Gastrointestinal:  Negative for diarrhea, nausea and vomiting.   Skin:  Negative for rash.   Neurological:  Negative for headaches.   All other systems reviewed and are negative.       EXAM:   /70   Pulse 63   Temp 97.4 °F (36.3 °C) (Tympanic)   Resp 16   Wt 261 lb (118.4 kg)   SpO2 97%   BMI 37.45 kg/m²   Physical Exam  Vitals and nursing note reviewed.   Constitutional:       Appearance: Normal appearance. He is not ill-appearing.   HENT:      Head: Normocephalic and atraumatic.      Right Ear: Hearing, tympanic membrane, ear canal and external ear normal. No drainage. There is no impacted cerumen. Tympanic membrane is not injected, perforated, erythematous or bulging.      Left Ear: Hearing and ear canal normal. Swelling and tenderness present. No drainage. There is no impacted cerumen. No mastoid tenderness. Tympanic membrane is injected, erythematous and bulging. Tympanic membrane is not perforated.      Ears:      Comments: External ear canal is slightly erythematous     Nose: Nose normal. No mucosal edema, congestion or rhinorrhea.      Right Sinus: No maxillary sinus tenderness or frontal sinus tenderness.      Left Sinus: No maxillary sinus tenderness or frontal sinus tenderness.      Mouth/Throat:      Lips: No lesions.      Mouth: Mucous membranes are moist. No oral lesions.      Palate: No lesions.      Pharynx: Oropharynx is clear. Uvula midline. No oropharyngeal exudate or posterior oropharyngeal erythema.      Tonsils: No tonsillar exudate or tonsillar abscesses.    Eyes:      General: No scleral icterus.        Right eye: No discharge.         Left eye: No discharge.      Conjunctiva/sclera: Conjunctivae normal.   Cardiovascular:      Rate and Rhythm: Normal rate and regular rhythm.      Heart sounds: Normal heart sounds. No murmur heard.  Pulmonary:      Effort: Pulmonary effort is normal.      Breath sounds: Normal breath sounds. No wheezing.   Lymphadenopathy:      Cervical: No cervical adenopathy.   Skin:     General: Skin is warm and dry.   Neurological:      Mental Status: He is alert and oriented to person, place, and time.   Psychiatric:         Mood and Affect: Mood normal.         Behavior: Behavior normal.          ASSESSMENT AND PLAN:   Sachin Burns is a 28 year old male who presents with ear problems symptoms are consistent with    ASSESSMENT:  Encounter Diagnoses   Name Primary?    Non-recurrent acute suppurative otitis media of left ear without spontaneous rupture of tympanic membrane Yes    Infection of ear, external, left        PLAN: Meds as listed below.  Comfort measures as described in Patient Instructions.      Meds & Refills for this Visit:  Requested Prescriptions     Signed Prescriptions Disp Refills    amoxicillin clavulanate 875-125 MG Oral Tab 14 tablet 0     Sig: Take 1 tablet by mouth 2 (two) times daily for 7 days.    ciprofloxacin-dexamethasone 0.3-0.1 % Otic Suspension 1 each 0     Sig: Place 4 drops into the left ear 2 (two) times daily for 7 days.         Risk and benefits of medication discussed.   If antibiotics prescribed, stressed importance of completing full course of antibiotic.     Acetaminophen or NSAID prn pain.      Follow up with PCP if s/sx worsen, do not begin to improve in 3 days, or if fever of 100.4 or greater persists for 72 hours.      Patient voiced understand and is in agreement with treatment plan.

## 2024-12-23 ENCOUNTER — OFFICE VISIT (OUTPATIENT)
Dept: FAMILY MEDICINE CLINIC | Facility: CLINIC | Age: 29
End: 2024-12-23
Payer: COMMERCIAL

## 2024-12-23 VITALS
OXYGEN SATURATION: 97 % | HEIGHT: 70 IN | RESPIRATION RATE: 16 BRPM | BODY MASS INDEX: 38.22 KG/M2 | SYSTOLIC BLOOD PRESSURE: 132 MMHG | DIASTOLIC BLOOD PRESSURE: 74 MMHG | HEART RATE: 86 BPM | WEIGHT: 267 LBS | TEMPERATURE: 101 F

## 2024-12-23 DIAGNOSIS — J11.1 INFLUENZA-LIKE ILLNESS: Primary | ICD-10-CM

## 2024-12-23 DIAGNOSIS — R50.9 FEVER, UNSPECIFIED FEVER CAUSE: ICD-10-CM

## 2024-12-23 DIAGNOSIS — R11.0 NAUSEA: ICD-10-CM

## 2024-12-23 PROCEDURE — 87637 SARSCOV2&INF A&B&RSV AMP PRB: CPT | Performed by: NURSE PRACTITIONER

## 2024-12-23 RX ORDER — FLUTICASONE PROPIONATE 50 MCG
2 SPRAY, SUSPENSION (ML) NASAL NIGHTLY
Qty: 1 EACH | Refills: 0 | Status: SHIPPED | OUTPATIENT
Start: 2024-12-23

## 2024-12-23 RX ORDER — ONDANSETRON 4 MG/1
4 TABLET, FILM COATED ORAL EVERY 8 HOURS PRN
Qty: 15 TABLET | Refills: 0 | Status: SHIPPED | OUTPATIENT
Start: 2024-12-23

## 2024-12-23 RX ORDER — ARIPIPRAZOLE 10 MG/1
TABLET ORAL
COMMUNITY
Start: 2017-12-01

## 2024-12-23 RX ORDER — BENZONATATE 200 MG/1
200 CAPSULE ORAL 3 TIMES DAILY PRN
Qty: 30 CAPSULE | Refills: 0 | Status: SHIPPED | OUTPATIENT
Start: 2024-12-23

## 2024-12-23 NOTE — PROGRESS NOTES
CHIEF COMPLAINT:     Chief Complaint   Patient presents with    Diarrhea     Sx Thursday PM - ST, nasal congestion, runny nose, PND  Sx Saturday - Fever (H of 101 this AM), body aches, chills, nausea, diarrhea (4-5 episodes/day), increased appetite  Sx yesterday - Body aches, chills, fatigue, general headache  Sx today - Dry cough, chest congestion, sinus pressure,   Denies ear pain/pressure, vomiting  No Covid test was done at home  OTC Theraflu and Tylenol       HPI:   Sachin Burns is a 29 year old male who presents for upper respiratory symptoms for  4 days. Patient reports dry sore throat, low grade fever, fatigue, nausea, diarrhea .  Progressed to body aches, chills, nasal congestion, cough, fever high of 102, chest congestion, occ sob.  No diarrhea for 24 hrs.  Symptoms have been ongoing since onset.  Treating symptoms with theraflu or tylenol.  Last dose 4am.    No ill contacts.  No COVID exposure.  No flu shot this season.  No recent travel.  + large gatherings but was already not feeling well.      Other conditions:   BMI: Body mass index is 38.31 kg/m².      Current Outpatient Medications   Medication Sig Dispense Refill    ARIPiprazole (ABILIFY) 10 MG Oral Tab       fluticasone propionate 50 MCG/ACT Nasal Suspension 2 sprays by Each Nare route daily. Apply two puffs in each nostril once daily for 1-week then as needed 1 each 0    ARIPiprazole 2 MG Oral Tab Take 1 tablet (2 mg total) by mouth as needed. (Patient not taking: Reported on 12/23/2024)        Past Medical History:    Anxiety    Bipolar disorder, current episode manic severe with psychotic features (HCC)    Depression      No past surgical history on file.      Social History     Socioeconomic History    Marital status:    Tobacco Use    Smoking status: Former     Types: Cigars    Smokeless tobacco: Never    Tobacco comments:     pt smoked  only month 4 Cigars/day   Vaping Use    Vaping status: Former   Substance and Sexual  Activity    Alcohol use: Yes     Comment: 2 times per week    Drug use: Never    Sexual activity: Yes     Birth control/protection: Condom   Social History Narrative    ** Merged History Encounter **              REVIEW OF SYSTEMS:   GENERAL: see HPI  SKIN: no rashes or abnormal skin lesions  HEENT: See HPI  LUNGS: See HPI  CARDIOVASCULAR: denies chest pain or palpitations   GI: denies V/C or abdominal pain  NEURO: denies dizziness or lightheadedness    EXAM:   /74   Pulse 86   Temp (!) 100.7 °F (38.2 °C) (Tympanic)   Resp 16   Ht 5' 10\" (1.778 m)   Wt 267 lb (121.1 kg)   SpO2 97%   BMI 38.31 kg/m²     Physical Exam  Vitals reviewed.   Constitutional:       General: He is not in acute distress.     Appearance: Normal appearance. He is not ill-appearing.   HENT:      Head: Normocephalic and atraumatic.      Right Ear: Tympanic membrane and ear canal normal.      Left Ear: Tympanic membrane and ear canal normal.      Ears:      Comments: Mod amount of wax bilaterally     Nose: Congestion and rhinorrhea present. Rhinorrhea is clear.      Mouth/Throat:      Lips: Pink.      Mouth: Mucous membranes are moist.      Pharynx: Oropharynx is clear.   Eyes:      Extraocular Movements: Extraocular movements intact.      Conjunctiva/sclera: Conjunctivae normal.   Cardiovascular:      Rate and Rhythm: Normal rate and regular rhythm.      Heart sounds: Normal heart sounds. No murmur heard.  Pulmonary:      Effort: Pulmonary effort is normal.      Breath sounds: Normal breath sounds and air entry.   Abdominal:      General: Bowel sounds are normal.      Palpations: Abdomen is soft. There is no hepatomegaly or splenomegaly.      Tenderness: There is no abdominal tenderness.   Musculoskeletal:      Cervical back: Normal range of motion and neck supple.   Lymphadenopathy:      Cervical: No cervical adenopathy.   Skin:     General: Skin is warm and dry.      Findings: No rash.   Neurological:      General: No focal deficit  present.      Mental Status: He is alert.   Psychiatric:         Speech: Speech normal.         Behavior: Behavior normal. Behavior is cooperative.          No results found for this or any previous visit (from the past 24 hours).    ASSESSMENT AND PLAN:   Sachin Burns is a 29 year old male who presents with     ASSESSMENT:   Encounter Diagnoses   Name Primary?    Influenza-like illness Yes    Fever, unspecified fever cause     Nausea        PLAN:   Discussed likely viral etiology. Will send quad test.  Reviewed symptom relief measures with patient.   Monitor for worsening symptoms.  Comfort care as described in Patient Instructions  Medications as below.  Pt to hold off on albuterol inhaler for now. To call PCP if needed.    Meds & Refills for this Visit:  Requested Prescriptions     Signed Prescriptions Disp Refills    fluticasone propionate 50 MCG/ACT Nasal Suspension 1 each 0     Si sprays by Each Nare route at bedtime.    benzonatate 200 MG Oral Cap 30 capsule 0     Sig: Take 1 capsule (200 mg total) by mouth 3 (three) times daily as needed for cough.    ondansetron (ZOFRAN) 4 mg tablet 15 tablet 0     Sig: Take 1 tablet (4 mg total) by mouth every 8 (eight) hours as needed for Nausea.       Risks, benefits, and side effects of medication explained and discussed.  To f/u with PCP if sx do not resolve as anticipated.  The patient indicates understanding of these issues and agrees to the plan.        Patient Instructions   Many symptoms of Influenza/Flu.    Flu is a virus, and not helped by antibiotics  The  antiviral Tamiflu can help shorten symptoms if started within 48 hrs of onset of symptoms.        Consider OSCILLOCOCCINUM to decrease flu symptoms/duration.  Take it 3 TIMES PER DAY consistently for best results.    Cepacol Throat Losenges for sore throat.  Robitussin (DM) or Mucinex (DM) or Delsym 12H for coughing.  Ibuprofen can help headache and body aches, if not contraindicated.  Get  plenty of rest, Drink plenty of fluids. Monitor temperature.   Promoted good hand washing, hand , and Chlorox/Lysol disinfectant use.    Symptoms usually 7-10 days. You will feel very tired for several days. You may not feel back to normal for 1-2 weeks.  Cough into sleeve. Wear mask if coughing around others. Avoid going out in public while sick.  May be contagious for up to a week after symptoms began, but may return to work 24-48 hours after fever completely gone (without Tylenol or Ibuprofen).     Seek medical attention with primary provider or ER if symptoms worsen, especially if shortness of breath or wheezing.

## 2024-12-23 NOTE — PATIENT INSTRUCTIONS
Many symptoms of Influenza/Flu.    Flu is a virus, and not helped by antibiotics  The  antiviral Tamiflu can help shorten symptoms if started within 48 hrs of onset of symptoms.        Consider OSCILLOCOCCINUM to decrease flu symptoms/duration.  Take it 3 TIMES PER DAY consistently for best results.    Cepacol Throat Losenges for sore throat.  Robitussin (DM) or Mucinex (DM) or Delsym 12H for coughing.  Ibuprofen can help headache and body aches, if not contraindicated.  Get plenty of rest, Drink plenty of fluids. Monitor temperature.   Promoted good hand washing, hand , and Chlorox/Lysol disinfectant use.    Symptoms usually 7-10 days. You will feel very tired for several days. You may not feel back to normal for 1-2 weeks.  Cough into sleeve. Wear mask if coughing around others. Avoid going out in public while sick.  May be contagious for up to a week after symptoms began, but may return to work 24-48 hours after fever completely gone (without Tylenol or Ibuprofen).     Seek medical attention with primary provider or ER if symptoms worsen, especially if shortness of breath or wheezing.

## 2024-12-25 LAB
FLUAV + FLUBV RNA SPEC NAA+PROBE: NOT DETECTED
FLUAV + FLUBV RNA SPEC NAA+PROBE: NOT DETECTED
RSV RNA SPEC NAA+PROBE: NOT DETECTED
SARS-COV-2 RNA RESP QL NAA+PROBE: DETECTED

## 2025-05-12 ENCOUNTER — LAB ENCOUNTER (OUTPATIENT)
Dept: LAB | Age: 30
End: 2025-05-12
Attending: Nurse Practitioner
Payer: COMMERCIAL

## 2025-05-12 ENCOUNTER — OFFICE VISIT (OUTPATIENT)
Dept: INTERNAL MEDICINE CLINIC | Facility: CLINIC | Age: 30
End: 2025-05-12
Payer: COMMERCIAL

## 2025-05-12 VITALS
HEIGHT: 70 IN | HEART RATE: 65 BPM | DIASTOLIC BLOOD PRESSURE: 68 MMHG | SYSTOLIC BLOOD PRESSURE: 110 MMHG | OXYGEN SATURATION: 97 % | BODY MASS INDEX: 37.94 KG/M2 | TEMPERATURE: 97 F | WEIGHT: 265 LBS

## 2025-05-12 DIAGNOSIS — R74.8 ELEVATED LIVER ENZYMES: ICD-10-CM

## 2025-05-12 DIAGNOSIS — Z13.29 THYROID DISORDER SCREEN: ICD-10-CM

## 2025-05-12 DIAGNOSIS — Z13.21 ENCOUNTER FOR VITAMIN DEFICIENCY SCREENING: ICD-10-CM

## 2025-05-12 DIAGNOSIS — Z13.0 SCREENING FOR DEFICIENCY ANEMIA: ICD-10-CM

## 2025-05-12 DIAGNOSIS — Z13.220 LIPID SCREENING: ICD-10-CM

## 2025-05-12 DIAGNOSIS — Z28.82 PARENT REFUSES IMMUNIZATIONS: ICD-10-CM

## 2025-05-12 DIAGNOSIS — Z00.00 WELLNESS EXAMINATION: ICD-10-CM

## 2025-05-12 DIAGNOSIS — N52.9 ERECTILE DISORDER: ICD-10-CM

## 2025-05-12 DIAGNOSIS — F25.0 SCHIZOAFFECTIVE DISORDER, BIPOLAR TYPE (HCC): ICD-10-CM

## 2025-05-12 DIAGNOSIS — Z00.00 WELLNESS EXAMINATION: Primary | ICD-10-CM

## 2025-05-12 LAB
ALBUMIN SERPL-MCNC: 4.8 G/DL (ref 3.2–4.8)
ALBUMIN/GLOB SERPL: 1.8 {RATIO} (ref 1–2)
ALP LIVER SERPL-CCNC: 65 U/L (ref 45–117)
ALT SERPL-CCNC: 74 U/L (ref 10–49)
ANION GAP SERPL CALC-SCNC: 7 MMOL/L (ref 0–18)
AST SERPL-CCNC: 38 U/L (ref ?–34)
BASOPHILS # BLD AUTO: 0.07 X10(3) UL (ref 0–0.2)
BASOPHILS NFR BLD AUTO: 0.9 %
BILIRUB SERPL-MCNC: 0.6 MG/DL (ref 0.3–1.2)
BUN BLD-MCNC: 10 MG/DL (ref 9–23)
BUN/CREAT SERPL: 9.8 (ref 10–20)
CALCIUM BLD-MCNC: 9.3 MG/DL (ref 8.7–10.4)
CHLORIDE SERPL-SCNC: 105 MMOL/L (ref 98–112)
CHOLEST SERPL-MCNC: 222 MG/DL (ref ?–200)
CO2 SERPL-SCNC: 26 MMOL/L (ref 21–32)
CREAT BLD-MCNC: 1.02 MG/DL (ref 0.7–1.3)
DEPRECATED RDW RBC AUTO: 40.6 FL (ref 35.1–46.3)
EGFRCR SERPLBLD CKD-EPI 2021: 103 ML/MIN/1.73M2 (ref 60–?)
EOSINOPHIL # BLD AUTO: 0.27 X10(3) UL (ref 0–0.7)
EOSINOPHIL NFR BLD AUTO: 3.6 %
ERYTHROCYTE [DISTWIDTH] IN BLOOD BY AUTOMATED COUNT: 12.9 % (ref 11–15)
FASTING PATIENT LIPID ANSWER: YES
FASTING STATUS PATIENT QL REPORTED: YES
GLOBULIN PLAS-MCNC: 2.7 G/DL (ref 2–3.5)
GLUCOSE BLD-MCNC: 97 MG/DL (ref 70–99)
HCT VFR BLD AUTO: 48.4 % (ref 39–53)
HDLC SERPL-MCNC: 42 MG/DL (ref 40–59)
HGB BLD-MCNC: 16.3 G/DL (ref 13–17.5)
IMM GRANULOCYTES # BLD AUTO: 0.03 X10(3) UL (ref 0–1)
IMM GRANULOCYTES NFR BLD: 0.4 %
LDLC SERPL CALC-MCNC: 150 MG/DL (ref ?–100)
LYMPHOCYTES # BLD AUTO: 2.11 X10(3) UL (ref 1–4)
LYMPHOCYTES NFR BLD AUTO: 28 %
MCH RBC QN AUTO: 29 PG (ref 26–34)
MCHC RBC AUTO-ENTMCNC: 33.7 G/DL (ref 31–37)
MCV RBC AUTO: 86 FL (ref 80–100)
MONOCYTES # BLD AUTO: 0.56 X10(3) UL (ref 0.1–1)
MONOCYTES NFR BLD AUTO: 7.4 %
NEUTROPHILS # BLD AUTO: 4.5 X10 (3) UL (ref 1.5–7.7)
NEUTROPHILS # BLD AUTO: 4.5 X10(3) UL (ref 1.5–7.7)
NEUTROPHILS NFR BLD AUTO: 59.7 %
NONHDLC SERPL-MCNC: 180 MG/DL (ref ?–130)
OSMOLALITY SERPL CALC.SUM OF ELEC: 285 MOSM/KG (ref 275–295)
PLATELET # BLD AUTO: 225 10(3)UL (ref 150–450)
PLATELETS.RETICULATED NFR BLD AUTO: 16.5 % (ref 0–7)
POTASSIUM SERPL-SCNC: 4.2 MMOL/L (ref 3.5–5.1)
PROT SERPL-MCNC: 7.5 G/DL (ref 5.7–8.2)
RBC # BLD AUTO: 5.63 X10(6)UL (ref 4.3–5.7)
SODIUM SERPL-SCNC: 138 MMOL/L (ref 136–145)
TRIGL SERPL-MCNC: 167 MG/DL (ref 30–149)
TSI SER-ACNC: 1.5 UIU/ML (ref 0.55–4.78)
VIT B12 SERPL-MCNC: 340 PG/ML (ref 211–911)
VIT D+METAB SERPL-MCNC: 13.4 NG/ML (ref 30–100)
VLDLC SERPL CALC-MCNC: 32 MG/DL (ref 0–30)
WBC # BLD AUTO: 7.5 X10(3) UL (ref 4–11)

## 2025-05-12 PROCEDURE — 80053 COMPREHEN METABOLIC PANEL: CPT

## 2025-05-12 PROCEDURE — 82306 VITAMIN D 25 HYDROXY: CPT

## 2025-05-12 PROCEDURE — 36415 COLL VENOUS BLD VENIPUNCTURE: CPT

## 2025-05-12 PROCEDURE — 84443 ASSAY THYROID STIM HORMONE: CPT

## 2025-05-12 PROCEDURE — 85025 COMPLETE CBC W/AUTO DIFF WBC: CPT

## 2025-05-12 PROCEDURE — 82607 VITAMIN B-12: CPT

## 2025-05-12 PROCEDURE — 80061 LIPID PANEL: CPT

## 2025-05-12 RX ORDER — ARIPIPRAZOLE 10 MG/1
10 TABLET ORAL DAILY
Qty: 30 TABLET | Refills: 1 | Status: SHIPPED | OUTPATIENT
Start: 2025-05-12 | End: 2025-07-11

## 2025-05-12 RX ORDER — SILDENAFIL 50 MG/1
50 TABLET, FILM COATED ORAL
Qty: 10 TABLET | Refills: 0 | Status: SHIPPED | OUTPATIENT
Start: 2025-05-12

## 2025-05-12 NOTE — PATIENT INSTRUCTIONS
- Annual screening labs  - Aim for 30 minutes of moderate to vigorous activity 4-5 times per week. Healthy diet - plenty of leafy green vegetables, lean protein, fruits, whole grains.  - Biannual dental exams and annual vision exam.  - Return in approximately 1 year for annual physical.

## 2025-05-12 NOTE — PROGRESS NOTES
Subjective:   Sachin Burns is a 29 year old male with PMH bipolar schizoaffective d/o who presents for [facility]     PMH:  Schizo-affective, bipolar - dx'd 7 years ago. Sees a therapist infrequently - monthly - stopped going around October b/c didn't have anything else to talk about. Doesn't see psychiatrist frequently and feels they just want to put him on meds - therapist supports him being off meds for now. Taking prn abilify from rx given in 2022 about 2-3 times monthly, and he feels this helps him.    Having some trouble maintaining an erection for the past 3 months - states this coincides with manic episodes over the past 3 months. He is interested and has the desire. He and his wife are currently contemplating starting a family. He is wondering if this is related to his mental state or stress.     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 No issues with exercising, no CP/difficulty breathing with exertion. No lightheadedness.    Etoh - rarely  Tobacco - cigar, hookah, vape - smoked 8 months straight during 1st manic episode. Can stop when he's not having manic episodes.  Drug use - none    Reports family Hx high cholesterol, diabetes - knows he \"at the body weight for these things to start happening.\" Leukemia grandmother. Mom has autoimmune condition and growth in her brain. Mom also has mental illness.  Colonoscopy (if >age 45): n/a    Dentist - regularly  Eye doctor - last saw a year ago, poss glaucoma R eye - they are monitoring. Wears glasses.  Immunizations due?  Flu (Aug-April): n/a  TDAP (every 10 years): declines        History/Other:    Chief Complaint Reviewed and Verified  No Further Nursing Notes to   Review  Tobacco Reviewed  Allergies Reviewed  Medications Reviewed    Problem List Reviewed  Medical History Reviewed  Surgical History   Reviewed   Family History Reviewed  Social History Reviewed         Tobacco:  He smoked tobacco in the past but quit greater than 12 months ago.  Tobacco Use[1]     Current Medications[2]      Review of Systems:  Review of Systems  10 point review of systems otherwise negative with the exception of HPI and assessment and plan.    Objective:   /68   Pulse 65   Temp 97.3 °F (36.3 °C) (Temporal)   Ht 5' 10\" (1.778 m)   Wt 265 lb (120.2 kg)   SpO2 97%   BMI 38.02 kg/m²  Estimated body mass index is 38.02 kg/m² as calculated from the following:    Height as of this encounter: 5' 10\" (1.778 m).    Weight as of this encounter: 265 lb (120.2 kg).      Physical Exam  Vitals reviewed.   Constitutional:       Appearance: He is well-developed. He is obese.   HENT:      Head: Normocephalic and atraumatic.      Right Ear: Tympanic membrane and external ear normal.      Left Ear: Tympanic membrane and external ear normal.      Nose: Nose normal.      Mouth/Throat:      Mouth: Mucous membranes are moist.      Pharynx: Oropharynx is clear.   Eyes:      Conjunctiva/sclera: Conjunctivae normal.      Pupils: Pupils are equal, round, and reactive to light.   Neck:      Thyroid: No thyromegaly or thyroid tenderness.   Cardiovascular:      Rate and Rhythm: Normal rate and regular rhythm.      Heart sounds: Normal heart sounds. No murmur heard.  Pulmonary:      Effort: Pulmonary effort is normal. No respiratory distress.      Breath sounds: Normal breath sounds.   Abdominal:      General: Bowel sounds are normal.      Palpations: Abdomen is soft. There is no hepatomegaly or splenomegaly.   Musculoskeletal:         General: Normal range of motion.      Cervical back: Normal range of motion and neck supple.      Right lower leg: No edema.      Left lower leg: No edema.   Lymphadenopathy:      Cervical:      Right cervical: No superficial cervical adenopathy.     Left cervical: No superficial cervical adenopathy.   Skin:     General:  Skin is warm and dry.   Neurological:      General: No focal deficit present.      Mental Status: He is alert and oriented to person, place, and time.      Motor: No weakness.      Coordination: Coordination normal.      Deep Tendon Reflexes: Reflexes are normal and symmetric.   Psychiatric:         Mood and Affect: Mood normal.         Behavior: Behavior normal.         Thought Content: Thought content normal.         Assessment & Plan:   1. Wellness examination (Primary)  -     Comp Metabolic Panel (14); Future; Expected date: 05/12/2025  -     CBC With Differential With Platelet; Future; Expected date: 05/12/2025  -     Lipid Panel; Future; Expected date: 05/12/2025  -     TSH W Reflex To Free T4; Future; Expected date: 05/12/2025  -     Vitamin D, 25-Hydroxy; Future; Expected date: 05/12/2025  - Annual screening labs plus will check for vitamin deficiencies in relation to his current mental state.  - Aim for 30 minutes of moderate to vigorous activity 4-5 times per week. Healthy diet - plenty of leafy green vegetables, lean protein, fruits, whole grains.  - Biannual dental exams and annual vision exam.  - Return in approximately 1 year for annual physical.  2. Encounter for vitamin deficiency screening  -     Vitamin D, 25-Hydroxy; Future; Expected date: 05/12/2025  -     Vitamin B12; Future; Expected date: 05/12/2025  - As above  3. Lipid screening  -     Lipid Panel; Future; Expected date: 05/12/2025  - As above  4. Screening for deficiency anemia  -     CBC With Differential With Platelet; Future; Expected date: 05/12/2025  - As above  5. Thyroid disorder screen  -     TSH W Reflex To Free T4; Future; Expected date: 05/12/2025  - As above  6. Erectile disorder  -     Sildenafil Citrate; Take 1 tablet (50 mg total) by mouth daily as needed for Erectile Dysfunction.  Dispense: 10 tablet; Refill: 0  - Trial of sildenafil for ED. Advised take no more than 1 tab in 24 hour period, take approx 1 hour prior to  sexual activity. To ER with erection lasting longer than 4 hours.  7. Schizoaffective disorder, bipolar type (HCC)  -     ARIPiprazole; Take 1 tablet (10 mg total) by mouth daily for 60 doses.  Dispense: 30 tablet; Refill: 1  -     Behavioral Health Consultation  - Refilled abilify as his current supply is likely . Advised he should be under the care of a psychiatrist for long-term management of this condition -  referral for assistance in locating a new psychiatrist.  8. Parent refuses immunizations        - Declined TdaP today.        Return in 1 year (on 2026).    FATOU Albrecht, 2025, 9:53 AM     This note was prepared using Dragon Medical voice recognition dictation software. As a result errors may occur. When identified, these errors have been corrected. While every attempt is made to correct errors during dictation discrepancies may still exist.         [1]   Social History  Tobacco Use   Smoking Status Former    Types: Cigars   Smokeless Tobacco Never   Tobacco Comments    pt smoked  only month 4 Cigars/day   [2]   Current Outpatient Medications   Medication Sig Dispense Refill    Sildenafil Citrate 50 MG Oral Tab Take 1 tablet (50 mg total) by mouth daily as needed for Erectile Dysfunction. 10 tablet 0    ARIPiprazole 10 MG Oral Tab Take 1 tablet (10 mg total) by mouth daily for 60 doses. 30 tablet 1    fluticasone propionate 50 MCG/ACT Nasal Suspension 2 sprays by Each Nare route at bedtime. (Patient not taking: Reported on 2025) 1 each 0    benzonatate 200 MG Oral Cap Take 1 capsule (200 mg total) by mouth 3 (three) times daily as needed for cough. (Patient not taking: Reported on 2025) 30 capsule 0    ondansetron (ZOFRAN) 4 mg tablet Take 1 tablet (4 mg total) by mouth every 8 (eight) hours as needed for Nausea. (Patient not taking: Reported on 2025) 15 tablet 0

## 2025-05-15 ENCOUNTER — TELEPHONE (OUTPATIENT)
Age: 30
End: 2025-05-15

## 2025-06-06 ENCOUNTER — TELEPHONE (OUTPATIENT)
Age: 30
End: 2025-06-06

## 2025-07-06 ENCOUNTER — TELEPHONE (OUTPATIENT)
Dept: INTERNAL MEDICINE CLINIC | Facility: CLINIC | Age: 30
End: 2025-07-06

## 2025-07-06 DIAGNOSIS — F25.0 SCHIZOAFFECTIVE DISORDER, BIPOLAR TYPE (HCC): ICD-10-CM

## 2025-07-08 RX ORDER — ARIPIPRAZOLE 10 MG/1
10 TABLET ORAL DAILY
Qty: 30 TABLET | Refills: 1 | OUTPATIENT
Start: 2025-07-08 | End: 2025-09-06

## 2025-07-08 NOTE — TELEPHONE ENCOUNTER
Please Review. Protocol Failed; No Protocol     Requested Prescriptions   Pending Prescriptions Disp Refills    ARIPIPRAZOLE 10 MG Oral Tab [Pharmacy Med Name: ARIPIPRAZOLE 10 MG TABLET] 30 tablet 1     Sig: Take 1 tablet (10 mg total) by mouth daily for 60 doses.       There is no refill protocol information for this order

## 2025-07-10 NOTE — TELEPHONE ENCOUNTER
Left voice message to call office back.  Office phone number provided with office telephone hours.  Second attempt

## 2025-07-11 NOTE — TELEPHONE ENCOUNTER
[employer] message was not sent with first or second attempt to reach patient.  Per chart review, patient is [employer] active, last accessed 7/9/25.  [employer] message sent.

## 2025-07-14 NOTE — TELEPHONE ENCOUNTER
Several attempts to reach patient, no call back. Patient has not reviewed mychart. No response letter sent      Ashley Nazario APRN to Em Rn Triage    7/8/25  1:19 PM  He was advised at the visit that moving forward, a psychiatrist should be managing this medication - according to confidential correspondence with [facility] staff, he had face to face end of June with a psychiatrist - please instruct him to reach out to that provider for a refill. Thanks!

## (undated) NOTE — LETTER
Date & Time: 5/26/2022, 11:02 AM  Patient: Renetta Ayala  Encounter Provider(s):    FATOU Em       To Whom It May Concern:    Renetta Ayala was seen and treated in our department on 5/26/2022. He should not return to work until 5/30/22. If you have any questions or concerns, please do not hesitate to call.         Kevin LAINEZ____________________________  DIAMBEUDG/SDF Signature

## (undated) NOTE — MR AVS SNAPSHOT
[facility]  [address] [place] [address], [address]  [id number] [place] [address] [phone]               Thank you for choosing us for your health care visit with Alexander Lockhart MD.  We are glad to serve you and happy to provide you with this summary of y If you feel pain while doing these stretching exercises, please stop and consult your doctor. Date Last Reviewed: 12/28/2015  © 1123-1806 The [facility] [place], [address] [place] [address] [employer]. All rights reserved.  This information is not [employer]. Remember, MyChart is NOT to be used for urgent needs. For medical emergencies, dial 911.         Educational Information     Healthy Diet and Regular Exercise  [employer] for making healthy food choices  -   Enjoy

## (undated) NOTE — LETTER
May 25, 2018    Shaneka MD Chacho  [address] [place] [address]     Patient: Cori Christy   YOB: 1995   Date of Visit: 5/25/2018       Dear Dr. Beverley Medina MD:    Thank you for referring Cori Christy to me for Lithium Carbonate  MG Oral Tab CR Take 450 mg by mouth 2 (two) times daily. Disp:  Rfl:    Ziprasidone HCl 20 MG Oral Cap Take 20 mg by mouth as needed. Disp:  Rfl:    Lurasidone HCl (LATUDA) 20 MG Oral Tab Take 20 mg by mouth as needed.  Disp:  Rfl: Abberant lash in Meibomian gland opening turned inward. Removed. Glaucoma suspect of both eyes  Increased cup disc so needs complete exam and OCT      No orders of the defined types were placed in this encounter.       Meds This Visit:    Signed Prescrip

## (undated) NOTE — LETTER
7/14/2025        Sachin Burns  [address] [facility] [address]         Dear Sachin,    This letter is to inform you that our office has made several attempts to reach you by phone without success.  We were attempting to contact you by phone regarding your prescription request for the Abilify. Per your last visit with Erum, it was advised that you reach out to your psychiatrist for further refills. At this time we are unable to provide any more refills of this medication.     Please contact our office at the number listed below if you have any further questions.      Sincerely,    FATOU Albrecht  [address] [facility] [address]  Ph: [phone]  Fax: [phone]         Document electronically generated by:  Perri CORREA RN